# Patient Record
Sex: FEMALE | Race: WHITE | NOT HISPANIC OR LATINO | Employment: OTHER | URBAN - METROPOLITAN AREA
[De-identification: names, ages, dates, MRNs, and addresses within clinical notes are randomized per-mention and may not be internally consistent; named-entity substitution may affect disease eponyms.]

---

## 2024-04-23 PROBLEM — G89.29 CHRONIC PAIN OF LEFT ANKLE: Status: ACTIVE | Noted: 2024-04-18

## 2024-04-23 PROBLEM — R60.9 EDEMA: Status: ACTIVE | Noted: 2021-10-07

## 2024-04-23 PROBLEM — M79.89 LEFT LEG SWELLING: Status: ACTIVE | Noted: 2021-06-01

## 2024-04-23 PROBLEM — M79.672 LEFT FOOT PAIN: Status: ACTIVE | Noted: 2024-04-19

## 2024-04-23 PROBLEM — M25.572 CHRONIC PAIN OF LEFT ANKLE: Status: ACTIVE | Noted: 2024-04-18

## 2024-04-23 PROBLEM — R62.7 ADULT FAILURE TO THRIVE: Status: ACTIVE | Noted: 2024-04-18

## 2024-04-23 PROBLEM — J44.89 COPD (CHRONIC OBSTRUCTIVE PULMONARY DISEASE) WITH CHRONIC BRONCHITIS (MULTI): Status: ACTIVE | Noted: 2024-04-23

## 2024-04-23 PROBLEM — T14.8XXA WOUND OF SKIN: Status: ACTIVE | Noted: 2021-06-01

## 2024-04-23 PROBLEM — R26.2 INABILITY TO AMBULATE DUE TO LEFT ANKLE OR FOOT: Status: ACTIVE | Noted: 2024-04-18

## 2024-04-23 PROBLEM — E44.0 MODERATE PROTEIN-CALORIE MALNUTRITION (MULTI): Status: ACTIVE | Noted: 2024-04-18

## 2024-04-23 PROBLEM — E53.8 VITAMIN B 12 DEFICIENCY: Status: ACTIVE | Noted: 2021-10-07

## 2024-04-23 PROBLEM — R06.09 DYSPNEA ON EXERTION: Status: ACTIVE | Noted: 2024-04-18

## 2024-04-23 PROBLEM — E55.9 VITAMIN D DEFICIENCY: Status: ACTIVE | Noted: 2021-10-07

## 2024-04-23 PROBLEM — L03.90 CELLULITIS: Status: ACTIVE | Noted: 2021-06-01

## 2024-05-22 ENCOUNTER — APPOINTMENT (OUTPATIENT)
Dept: VASCULAR SURGERY | Facility: CLINIC | Age: 89
End: 2024-05-22
Payer: MEDICARE

## 2024-06-19 ENCOUNTER — OFFICE VISIT (OUTPATIENT)
Dept: VASCULAR SURGERY | Facility: CLINIC | Age: 89
End: 2024-06-19
Payer: MEDICARE

## 2024-06-19 VITALS
HEART RATE: 70 BPM | HEIGHT: 66 IN | BODY MASS INDEX: 16.07 KG/M2 | WEIGHT: 100 LBS | DIASTOLIC BLOOD PRESSURE: 77 MMHG | OXYGEN SATURATION: 94 % | SYSTOLIC BLOOD PRESSURE: 171 MMHG

## 2024-06-19 DIAGNOSIS — I89.0 LYMPHEDEMA: Primary | ICD-10-CM

## 2024-06-19 DIAGNOSIS — R60.9 SWELLING: ICD-10-CM

## 2024-06-19 PROCEDURE — 3078F DIAST BP <80 MM HG: CPT | Performed by: NURSE PRACTITIONER

## 2024-06-19 PROCEDURE — 1125F AMNT PAIN NOTED PAIN PRSNT: CPT | Performed by: NURSE PRACTITIONER

## 2024-06-19 PROCEDURE — 3077F SYST BP >= 140 MM HG: CPT | Performed by: NURSE PRACTITIONER

## 2024-06-19 PROCEDURE — 99203 OFFICE O/P NEW LOW 30 MIN: CPT | Performed by: NURSE PRACTITIONER

## 2024-06-19 PROCEDURE — 99213 OFFICE O/P EST LOW 20 MIN: CPT | Performed by: NURSE PRACTITIONER

## 2024-06-19 PROCEDURE — 1159F MED LIST DOCD IN RCRD: CPT | Performed by: NURSE PRACTITIONER

## 2024-06-19 ASSESSMENT — PAIN SCALES - GENERAL: PAINLEVEL: 6

## 2024-06-19 ASSESSMENT — PATIENT HEALTH QUESTIONNAIRE - PHQ9
1. LITTLE INTEREST OR PLEASURE IN DOING THINGS: NOT AT ALL
2. FEELING DOWN, DEPRESSED OR HOPELESS: NOT AT ALL
SUM OF ALL RESPONSES TO PHQ9 QUESTIONS 1 AND 2: 0

## 2024-06-19 ASSESSMENT — ENCOUNTER SYMPTOMS
LOSS OF SENSATION IN FEET: 0
DEPRESSION: 0
OCCASIONAL FEELINGS OF UNSTEADINESS: 0

## 2024-06-19 NOTE — PATIENT INSTRUCTIONS
Tigist,    It was really nice to meet you!  Thank you for allowing me to participate in your care.    We discussed your bilateral leg swelling, left leg worse.  I believe you have a component of systemic swelling as well and is lymphedema contributing to your current clinical picture.  Additionally, you state that you sleep in a chair with your legs in a dependent position.  I recommend that you begin sleeping in the bed if possible so that you can elevate your leg.  We placed a left leg Unna boot on you today.  I recommend following up in 1 week for Unna boot removal and possible replacement.

## 2024-06-19 NOTE — PROGRESS NOTES
History Of Present Illness  Tigist Wilson is a 89 y.o. female presenting with bilateral lower extremity swelling, left more so than right.  Patient notes that her left leg swelling seems to have worsened approximately 2 months ago.  She states that she had a left lower extremity pain and subsequently developed swelling.  She went to her primary care provider's office and was sent to the emergency room.  Is admitted to the hospital for this issue and had a venous duplex which was negative for DVT.  She also had a cardiac workup that showed LV normal size and normal EF.  She is frustrated because she feels like she is not getting any answers with regards to her left leg swelling.     Past Medical History  Patient Active Problem List   Diagnosis    Actinic keratosis    Adult failure to thrive    Inability to ambulate due to left ankle or foot    Cellulitis    Chronic pain of both knees    Chronic pain of left ankle    COPD (chronic obstructive pulmonary disease) (Multi)    COPD (chronic obstructive pulmonary disease) with chronic bronchitis (Multi)    Dyspnea on exertion    Edema    Hypertension    Left foot pain    Left leg swelling    Lumbago    Moderate protein-calorie malnutrition (Multi)    Osteopenia    Peripheral enthesopathies and allied syndromes    Primary osteoarthritis of both knees    Seasonal depression (CMS-[facility])    Vitamin B 12 deficiency    Vitamin D deficiency    Wound of skin        Surgical History  She has no past surgical history on file.     Social History  She has no history on file for tobacco use, alcohol use, and drug use.    Family History  No family history on file.     Allergies  Adhesive tape-silicones, Dog dander, and Penicillins    Review of Systems    CONSTITUTIONAL: Denies weight loss, fever and chills.    HEENT: Denies changes in vision and hearing.    RESPIRATORY: Denies SOB and cough.    CV: Denies palpitations and CP.    GI: Denies abdominal pain, nausea, vomiting and  diarrhea.    : Denies dysuria and urinary frequency.    MSK: Denies myalgia and joint pain.    SKIN: Denies rash and pruritus.    VASC: Denies claudication, ischemic rest pain, or open wounds or sores.  Positive for bilateral lower extremity edema, left more so than right    NEUROLOGICAL: Denies headache and syncope.    PSYCHIATRIC: Denies recent changes in mood. Denies anxiety and depression.     Physical Exam    General: Pt is alert and oriented x 3. Pleasant, conversive  HEENT: Head is atraumatic, normocephalic.   Cardiac: Normal S1-S2.  Regular rate and rhythm.  No murmurs.  Respiratory: Lungs clear to auscultation.  No adventitious sounds.  Abdomen: Soft, nondistended, nontender.  Bowel sounds x4 quadrants.  Pulse exam: Palpable brachial and radial pulses bilaterall.  Weakly palpable pedal pulses bilaterally  Extremities: Left leg edema 3+ pitting.  Right leg edema 1-2+ pitting.  There is no thigh swelling.  Positive dorsal foot hump on the left.  Neuro: Moves all extremities spontaneously.  No focal deficits.  Psych: Appropriate affect.  Answers questions appropriately.     Last Recorded Vitals  /77   Pulse 70   Wt 45.4 kg (100 lb)   SpO2 94%     Relevant Results    Current Outpatient Medications   Medication Instructions    acetaminophen (TYLENOL) 1,000 mg, oral, Every 6 hours PRN    calcium carbonate 1,200 mg, oral    furosemide (LASIX) 40 mg, oral          Assessment/Plan   Lymphedema   Bilateral Lower Extremity Edema    On the physical exam, the patient has significantly more swelling left leg than right leg.  There is a dorsal foot hump and qualities that are consistent with lymphedema.  There is no thigh swelling bilaterally which makes me less likely to consider more proximal venous compression syndrome.  Additionally, the patient had a venous duplex which was negative for DVT.  There is also edema noted on the right, but it is definitely less severe.  Plan for left lower extremity Unna boot  and follow-up in 1 week.  Ideally, we can decrease the swelling and then plan for compression stocking to maintain her progress.           Jersey Gama, APRN-CNP

## 2024-06-26 ENCOUNTER — OFFICE VISIT (OUTPATIENT)
Dept: VASCULAR SURGERY | Facility: CLINIC | Age: 89
End: 2024-06-26
Payer: MEDICARE

## 2024-06-26 VITALS
OXYGEN SATURATION: 95 % | DIASTOLIC BLOOD PRESSURE: 67 MMHG | HEIGHT: 66 IN | SYSTOLIC BLOOD PRESSURE: 128 MMHG | WEIGHT: 100 LBS | HEART RATE: 69 BPM | BODY MASS INDEX: 16.07 KG/M2

## 2024-06-26 DIAGNOSIS — I89.0 LYMPHEDEMA: Primary | ICD-10-CM

## 2024-06-26 DIAGNOSIS — R60.9 SWELLING: ICD-10-CM

## 2024-06-26 PROCEDURE — 1159F MED LIST DOCD IN RCRD: CPT | Performed by: NURSE PRACTITIONER

## 2024-06-26 PROCEDURE — 99213 OFFICE O/P EST LOW 20 MIN: CPT | Performed by: NURSE PRACTITIONER

## 2024-06-26 PROCEDURE — 1036F TOBACCO NON-USER: CPT | Performed by: NURSE PRACTITIONER

## 2024-06-26 PROCEDURE — 3078F DIAST BP <80 MM HG: CPT | Performed by: NURSE PRACTITIONER

## 2024-06-26 PROCEDURE — 3074F SYST BP LT 130 MM HG: CPT | Performed by: NURSE PRACTITIONER

## 2024-06-26 PROCEDURE — 1126F AMNT PAIN NOTED NONE PRSNT: CPT | Performed by: NURSE PRACTITIONER

## 2024-06-26 ASSESSMENT — PATIENT HEALTH QUESTIONNAIRE - PHQ9
1. LITTLE INTEREST OR PLEASURE IN DOING THINGS: NOT AT ALL
SUM OF ALL RESPONSES TO PHQ9 QUESTIONS 1 AND 2: 0
2. FEELING DOWN, DEPRESSED OR HOPELESS: NOT AT ALL

## 2024-06-26 ASSESSMENT — ENCOUNTER SYMPTOMS
OCCASIONAL FEELINGS OF UNSTEADINESS: 0
DEPRESSION: 0
LOSS OF SENSATION IN FEET: 0

## 2024-06-26 ASSESSMENT — PAIN SCALES - GENERAL: PAINLEVEL: 0-NO PAIN

## 2024-06-26 ASSESSMENT — COLUMBIA-SUICIDE SEVERITY RATING SCALE - C-SSRS
2. HAVE YOU ACTUALLY HAD ANY THOUGHTS OF KILLING YOURSELF?: NO
1. IN THE PAST MONTH, HAVE YOU WISHED YOU WERE DEAD OR WISHED YOU COULD GO TO SLEEP AND NOT WAKE UP?: NO
6. HAVE YOU EVER DONE ANYTHING, STARTED TO DO ANYTHING, OR PREPARED TO DO ANYTHING TO END YOUR LIFE?: NO

## 2024-06-26 NOTE — PROGRESS NOTES
History Of Present Illness  Tigist Wilson is a 89 y.o. female presenting with bilateral lower extremity swelling, left more so than right.  Patient notes that her left leg swelling seems to have worsened approximately 2 months ago.  She was seen in the office last week and was placed in a left lower extremity Unna boot.  She notes that the left lower extremity has decreased in swelling quite significantly.  Now, her left leg is actually smaller than her right leg.     Past Medical History  Patient Active Problem List   Diagnosis    Actinic keratosis    Adult failure to thrive    Inability to ambulate due to left ankle or foot    Cellulitis    Chronic pain of both knees    Chronic pain of left ankle    COPD (chronic obstructive pulmonary disease) (Multi)    COPD (chronic obstructive pulmonary disease) with chronic bronchitis (Multi)    Dyspnea on exertion    Edema    Hypertension    Left foot pain    Left leg swelling    Lumbago    Moderate protein-calorie malnutrition (Multi)    Osteopenia    Peripheral enthesopathies and allied syndromes    Primary osteoarthritis of both knees    Seasonal depression (CMS-HCC)    Vitamin B 12 deficiency    Vitamin D deficiency    Wound of skin        Surgical History  She has no past surgical history on file.     Social History  She reports that she quit smoking about 34 years ago. Her smoking use included cigarettes. She started smoking about 74 years ago. She has a 40 pack-year smoking history. She has never used smokeless tobacco. She reports that she does not currently use alcohol. She reports that she does not use drugs.    Family History  No family history on file.     Allergies  Adhesive tape-silicones, Dog dander, and Penicillins    Review of Systems    CONSTITUTIONAL: Denies weight loss, fever and chills.    HEENT: Denies changes in vision and hearing.    RESPIRATORY: Denies SOB and cough.    CV: Denies palpitations and CP.    GI: Denies abdominal pain, nausea, vomiting and  diarrhea.    : Denies dysuria and urinary frequency.    MSK: Denies myalgia and joint pain.    SKIN: Denies rash and pruritus.    VASC: Denies claudication, ischemic rest pain, or open wounds or sores.  Positive for bilateral lower extremity edema, left more so than right    NEUROLOGICAL: Denies headache and syncope.    PSYCHIATRIC: Denies recent changes in mood. Denies anxiety and depression.     Physical Exam    General: Pt is alert and oriented x 3. Pleasant, conversive  HEENT: Head is atraumatic, normocephalic.   Cardiac: Normal S1-S2.  Regular rate and rhythm.  No murmurs.  Respiratory: Lungs clear to auscultation.  No adventitious sounds.  Abdomen: Soft, nondistended, nontender.  Bowel sounds x4 quadrants.  Pulse exam: Palpable brachial and radial pulses bilaterall.  Weakly palpable pedal pulses bilaterally  Extremities: Left leg edema 1+ pitting.  Right leg edema 1-2+ pitting.  There is no thigh swelling.  Positive dorsal foot hump on the left.  Neuro: Moves all extremities spontaneously.  No focal deficits.  Psych: Appropriate affect.  Answers questions appropriately.     Last Recorded Vitals  /67 (BP Location: Left arm, Patient Position: Sitting)   Pulse 69   Wt 45.4 kg (100 lb)   SpO2 95%     Relevant Results    Current Outpatient Medications   Medication Instructions    acetaminophen (TYLENOL) 1,000 mg, oral, Every 6 hours PRN    calcium carbonate 1,200 mg, oral    furosemide (LASIX) 40 mg, oral          Assessment/Plan   Lymphedema   Bilateral Lower Extremity Edema    Patient has a significantly improved lower extremity edema on the left with the use of Unna boot therapy to for compression.  I do not believe the patient is physically able to don and doff traditional compression garments.  Therefore, I applied double Tubigrip's to both legs and advised her to keep these on during the day and take off at night.  She also notes that her primary care provider has retired and she has tried to  schedule visits with other providers with no success.  I gave her the name of Dr. Anderson and her phone number.  Follow-up as needed           Jersey Gama, JOSE-CNP

## 2024-06-26 NOTE — PATIENT INSTRUCTIONS
Tigist,    It was good to see you again!  Thank you for allow me to participate in your care.      Your leg swelling has improved tremendously with the use of Unna boot and compression.  I gave you Tubigrip's to continue to use on a daily basis and take off at night.  Please follow-up with new primary care provider for evaluation.  I referred you to Dr. Ashley Anderson.  You can follow-up with us on an as-needed basis.